# Patient Record
Sex: FEMALE | Race: WHITE | NOT HISPANIC OR LATINO | URBAN - NONMETROPOLITAN AREA
[De-identification: names, ages, dates, MRNs, and addresses within clinical notes are randomized per-mention and may not be internally consistent; named-entity substitution may affect disease eponyms.]

---

## 2019-12-10 ENCOUNTER — IMPORTED ENCOUNTER (OUTPATIENT)
Dept: URBAN - NONMETROPOLITAN AREA CLINIC 1 | Facility: CLINIC | Age: 54
End: 2019-12-10

## 2019-12-10 PROBLEM — H40.013: Noted: 2019-12-10

## 2019-12-10 PROCEDURE — 92015 DETERMINE REFRACTIVE STATE: CPT

## 2019-12-10 PROCEDURE — 92004 COMPRE OPH EXAM NEW PT 1/>: CPT

## 2019-12-10 NOTE — PATIENT DISCUSSION
Glaucoma Suspect-Based on IOP fam hx-Appears stable at this time.-Continue to monitor with exams and testing. 6 month IOP check **Waiting on records from previous doctor.

## 2020-12-28 ENCOUNTER — IMPORTED ENCOUNTER (OUTPATIENT)
Dept: URBAN - NONMETROPOLITAN AREA CLINIC 1 | Facility: CLINIC | Age: 55
End: 2020-12-28

## 2020-12-28 PROBLEM — H52.4: Noted: 2020-12-28

## 2020-12-28 PROBLEM — H40.013: Noted: 2020-12-28

## 2020-12-28 PROCEDURE — 92014 COMPRE OPH EXAM EST PT 1/>: CPT

## 2020-12-28 PROCEDURE — 92015 DETERMINE REFRACTIVE STATE: CPT

## 2020-12-28 NOTE — PATIENT DISCUSSION
Glaucoma Suspect-Based on IOP fam hx- CD noted 0.1 OU. stable and healthy-Appears stable at this time.-Continue to monitor with exams and testing.-Recommend follow up patient yearly with testing Presbyopia Discussed refractive status with patient MR done and new gls rx given but ok to use otc readers still Continue to monitor; Dr's Notes: Nel HIRSCHE 12/28/20 12/28/20

## 2022-01-05 ENCOUNTER — IMPORTED ENCOUNTER (OUTPATIENT)
Dept: URBAN - NONMETROPOLITAN AREA CLINIC 1 | Facility: CLINIC | Age: 57
End: 2022-01-05

## 2022-01-05 PROBLEM — H16.223: Noted: 2022-01-05

## 2022-01-05 PROBLEM — H40.013: Noted: 2022-01-05

## 2022-01-05 PROBLEM — H25.813: Noted: 2022-01-05

## 2022-01-05 PROBLEM — H52.4: Noted: 2022-01-05

## 2022-01-05 PROBLEM — D23.122: Noted: 2022-01-05

## 2022-01-05 PROCEDURE — 76514 ECHO EXAM OF EYE THICKNESS: CPT

## 2022-01-05 PROCEDURE — 92133 CPTRZD OPH DX IMG PST SGM ON: CPT

## 2022-01-05 PROCEDURE — 92015 DETERMINE REFRACTIVE STATE: CPT

## 2022-01-05 PROCEDURE — 92285 EXTERNAL OCULAR PHOTOGRAPHY: CPT

## 2022-01-05 PROCEDURE — 92014 COMPRE OPH EXAM EST PT 1/>: CPT

## 2022-01-05 NOTE — PATIENT DISCUSSION
Glaucoma Suspect-Based on IOP fam hx-IOP 22:21 01/05/22-Appears stable at this time. -OCT ONH and Pach ordered and performed today -Recommend follow up patient yearly with testing Cataract OU-Not yet surgical. -Reviewed symptoms of advancing cataract growth such as glare and halos and decreased vision.-Continue to monitor for now. Pt will notify us if any new symptoms develop. 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

## 2022-04-09 ASSESSMENT — VISUAL ACUITY
OS_CC: 20/40
OD_CC: 20/20
OD_CC: 20/20-1
OS_CC: 20/40
OD_CC: J2
OS_CC: J3
OD_CC: 20/20
OS_CC: 20/30-2

## 2022-04-09 ASSESSMENT — TONOMETRY
OS_IOP_MMHG: 21
OS_IOP_MMHG: 21
OD_IOP_MMHG: 22
OS_IOP_MMHG: 21
OD_IOP_MMHG: 20
OD_IOP_MMHG: 22

## 2022-07-15 ENCOUNTER — CONSULTATION/EVALUATION (OUTPATIENT)
Dept: RURAL CLINIC 1 | Facility: CLINIC | Age: 57
End: 2022-07-15

## 2022-07-15 DIAGNOSIS — D23.122: ICD-10-CM

## 2022-07-15 PROCEDURE — 92012 INTRM OPH EXAM EST PATIENT: CPT

## 2022-07-15 ASSESSMENT — VISUAL ACUITY
OD_SC: 20/25+2
OS_SC: 20/30-1

## 2022-07-15 ASSESSMENT — TONOMETRY
OD_IOP_MMHG: 17
OS_IOP_MMHG: 19

## 2022-07-15 NOTE — PATIENT DISCUSSION
The risks, benefits, and alternatives to surgery were discussed. The patient elects to proceed with surgery. Will have Luh to call.

## 2023-01-06 ENCOUNTER — ESTABLISHED PATIENT (OUTPATIENT)
Dept: RURAL CLINIC 2 | Facility: CLINIC | Age: 58
End: 2023-01-06

## 2023-01-06 DIAGNOSIS — H40.013: ICD-10-CM

## 2023-01-06 DIAGNOSIS — H52.4: ICD-10-CM

## 2023-01-06 DIAGNOSIS — H25.813: ICD-10-CM

## 2023-01-06 DIAGNOSIS — D23.122: ICD-10-CM

## 2023-01-06 DIAGNOSIS — H16.223: ICD-10-CM

## 2023-01-06 PROCEDURE — 92014 COMPRE OPH EXAM EST PT 1/>: CPT

## 2023-01-06 PROCEDURE — 92133 CPTRZD OPH DX IMG PST SGM ON: CPT

## 2023-01-06 PROCEDURE — 92015 DETERMINE REFRACTIVE STATE: CPT

## 2023-01-06 ASSESSMENT — TONOMETRY
OS_IOP_MMHG: 23
OD_IOP_MMHG: 23

## 2023-01-06 ASSESSMENT — VISUAL ACUITY
OD_SC: 20/20
OS_SC: 20/40

## 2023-01-06 NOTE — PATIENT DISCUSSION
Has evaluation with Dr. Kendal Griffin in 7/22 but was never called to schedule actual excision.  Will reach out to surgical coordination team to determine most efficient next steps.

## 2024-01-08 ENCOUNTER — ESTABLISHED PATIENT (OUTPATIENT)
Dept: RURAL CLINIC 2 | Facility: CLINIC | Age: 59
End: 2024-01-08

## 2024-01-08 DIAGNOSIS — H16.223: ICD-10-CM

## 2024-01-08 DIAGNOSIS — D23.122: ICD-10-CM

## 2024-01-08 DIAGNOSIS — H40.013: ICD-10-CM

## 2024-01-08 DIAGNOSIS — H52.03: ICD-10-CM

## 2024-01-08 DIAGNOSIS — H52.4: ICD-10-CM

## 2024-01-08 DIAGNOSIS — H52.223: ICD-10-CM

## 2024-01-08 DIAGNOSIS — D23.112: ICD-10-CM

## 2024-01-08 DIAGNOSIS — H25.813: ICD-10-CM

## 2024-01-08 PROCEDURE — 92015 DETERMINE REFRACTIVE STATE: CPT

## 2024-01-08 PROCEDURE — 92014 COMPRE OPH EXAM EST PT 1/>: CPT

## 2024-01-08 PROCEDURE — 92133 CPTRZD OPH DX IMG PST SGM ON: CPT

## 2024-01-08 ASSESSMENT — TONOMETRY
OS_IOP_MMHG: 21
OD_IOP_MMHG: 22

## 2024-01-08 ASSESSMENT — VISUAL ACUITY
OD_SC: 20/25
OS_SC: 20/40+2

## 2025-01-10 ENCOUNTER — COMPREHENSIVE EXAM (OUTPATIENT)
Age: 60
End: 2025-01-10

## 2025-01-10 DIAGNOSIS — D23.112: ICD-10-CM

## 2025-01-10 DIAGNOSIS — H52.03: ICD-10-CM

## 2025-01-10 DIAGNOSIS — H25.813: ICD-10-CM

## 2025-01-10 DIAGNOSIS — H52.4: ICD-10-CM

## 2025-01-10 DIAGNOSIS — H52.223: ICD-10-CM

## 2025-01-10 DIAGNOSIS — D23.122: ICD-10-CM

## 2025-01-10 DIAGNOSIS — H40.013: ICD-10-CM

## 2025-01-10 DIAGNOSIS — H16.223: ICD-10-CM

## 2025-01-10 PROCEDURE — 92015 DETERMINE REFRACTIVE STATE: CPT

## 2025-01-10 PROCEDURE — 99213 OFFICE O/P EST LOW 20 MIN: CPT

## 2025-01-10 PROCEDURE — 92133 CPTRZD OPH DX IMG PST SGM ON: CPT
